# Patient Record
Sex: FEMALE | Race: WHITE | Employment: PART TIME
[De-identification: names, ages, dates, MRNs, and addresses within clinical notes are randomized per-mention and may not be internally consistent; named-entity substitution may affect disease eponyms.]

---
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
## 2018-01-12 ENCOUNTER — OFFICE VISIT (OUTPATIENT)
Dept: PEDIATRIC NEUROLOGY | Age: 18
End: 2018-01-12
Payer: COMMERCIAL

## 2018-01-12 VITALS
SYSTOLIC BLOOD PRESSURE: 112 MMHG | HEART RATE: 68 BPM | WEIGHT: 143.4 LBS | HEIGHT: 65 IN | BODY MASS INDEX: 23.89 KG/M2 | DIASTOLIC BLOOD PRESSURE: 71 MMHG

## 2018-01-12 DIAGNOSIS — Q79.60 EDS (EHLERS-DANLOS SYNDROME): ICD-10-CM

## 2018-01-12 DIAGNOSIS — G44.221 CHRONIC TENSION-TYPE HEADACHE, INTRACTABLE: Primary | ICD-10-CM

## 2018-01-12 DIAGNOSIS — G47.9 SLEEP DIFFICULTIES: ICD-10-CM

## 2018-01-12 DIAGNOSIS — G90.A POTS (POSTURAL ORTHOSTATIC TACHYCARDIA SYNDROME): ICD-10-CM

## 2018-01-12 DIAGNOSIS — G43.111 INTRACTABLE MIGRAINE WITH AURA WITH STATUS MIGRAINOSUS: ICD-10-CM

## 2018-01-12 DIAGNOSIS — M54.2 NECK PAIN: ICD-10-CM

## 2018-01-12 PROCEDURE — 99215 OFFICE O/P EST HI 40 MIN: CPT | Performed by: PSYCHIATRY & NEUROLOGY

## 2018-01-12 RX ORDER — PROPRANOLOL HCL 60 MG
60 CAPSULE, EXTENDED RELEASE 24HR ORAL DAILY
Qty: 30 CAPSULE | Refills: 5 | Status: SHIPPED | OUTPATIENT
Start: 2018-01-12

## 2018-01-12 RX ORDER — UBIDECARENONE 100 MG
100 CAPSULE ORAL EVERY MORNING
Qty: 30 CAPSULE | Refills: 5 | Status: SHIPPED | OUTPATIENT
Start: 2018-01-12

## 2018-01-12 RX ORDER — DIHYDROERGOTAMINE MESYLATE 4 MG/ML
1 SPRAY NASAL PRN
Qty: 1 ML | Refills: 5 | Status: SHIPPED | OUTPATIENT
Start: 2018-01-12

## 2018-01-12 RX ORDER — ASCORBIC ACID 500 MG
500 TABLET ORAL DAILY
Qty: 30 TABLET | Refills: 5 | Status: SHIPPED | OUTPATIENT
Start: 2018-01-12

## 2018-01-12 NOTE — LETTER
some occasions. Amor states that she has utilized the Reece Schwab" in the past which has provided relief of her migraine symptoms. Amor states that she has tried the Imitrex nasal spray in the past to help relieve the symptoms of her migraine, however this seemed to make the migraines worse so she stopped using this medication. Migraine description provided below:     MIGRAINE DESCRIPTION:   They describe the pain to involve the bifrontal and temporal regions at an intensity of 9/10-10/10. Amor states that prior to the migraine, she will see fuzzy dots. Also prior to the migraine, she will have numbness in her hands and then it will radiate to her whole body. Amor describes the migraine to be a throbbing 9-10/10 pain in her temples. She also complains of \"deep pain\" behind her eyes prior to her migraine. When she has a migraine, she prefers to lay in a dark, quiet room. She states that she will also feel nauseaous with the headaches. Sleep gives partial relief of these migraines. SLEEP ISSUES:   Amor states that she continues to have issues with sleep which remains virtually unchanged from the last visit. She states that her sleep continues to be disturbed as she will lay down for bed between 7-8 pm and it will take her up to 2 hours to fall asleep at night. Amor states that she continues to wake up several occasions throughout the night, usually to use the restroom or get something to eat. Amor states that when she tries to lay down to go back to sleep, she has a difficult time because she cannot get comfortable. Amor states that she continues to wake up between 6-7 am every morning. Amor denies any daytime sleepiness at this time as she \"has no time for naps\". Amor states that she continues to take Adderall in this regard which is also being managed by Dr. Hitesh Bryan for fatigue, POTS and \"brain fog. \"     NECK PAIN: Amor states that she continues to have neck pain and describes the neck pain as \"pretty intense\". She states that the pain usually radiates from her head down to her neck and shoulders. Amor states that she has been doing massage every two weeks which has seemed to provide some relief of her neck pain. It is to be reported that Amor was diagnosed with joint hypermobility in the past. This remains virtually unchanged from the last visit. Amor states that she is no longer taking Gabapentin in this regard. Previously Tried Medications: Propranolol, Botox injections - ineffective, pain meds - ineffective, Imitrex - ineffective, Elavil - taken off due to POTS, Magnesium Oxide and Vitamin B2 (course completed), Topamax (ineffective), Depakote (increased headaches), Florinef (ineffective and not able to tolerate), Midodrine (ineffective and not able to tolerate), Gabapentin    REVIEW OF SYSTEMS:  Constitutional: Negative. Eyes: Negative. Respiratory: Negative. Cardiovascular: Negative. Gastrointestinal: Negative. Genitourinary: Negative. Musculoskeletal: Negative    Skin: Negative. Neurological: positive for headaches and migraines, negative for seizures, negative for developmental delays. Hematological: Negative. Psychiatric/Behavioral: negative for behavioral issues, negative for ADHD, positive for sleep issues    All other systems reviewed and are negative. Past, social, family, and developmental history was reviewed and unchanged. OBJECTIVE:   PHYSICAL EXAM  /71   Pulse 68   Ht 5' 5\" (1.651 m)   Wt 143 lb 6.4 oz (65 kg)   BMI 23.86 kg/m²     Neurological: she is alert and has normal strength and normal reflexes. she displays no atrophy, no tremor and normal reflexes. No cranial nerve deficit or sensory deficit. she exhibits normal muscle tone. she can stand and walk. she displays no seizure activity. Reflex Scores: 2+ diffuse.  No focal weakness noted on exam. Nursing note and vitals reviewed. Constitutional: she appears well-developed and well-nourished. HENT: Mouth/Throat: Mucous membranes are moist.   Eyes: EOM are normal. Pupils are equal, round, and reactive to light. Fundoscopic exam reveals sharp discs bilaterally. Neck: Normal range of motion. Neck supple. Cardiovascular: Regular rhythm, S1 normal and S2 normal.   Pulmonary/Chest: Effort normal and breath sounds normal.   Lymph Nodes: No significant lymphadenopathy noted. Musculoskeletal: Normal range of motion. Neurological: she is alert and rest of the exam is as mentioned above. Skin: Skin is warm and dry. No lesions or ulcers. RECORD REVIEW: Previous medical records were reviewed at today's visit. DIAGNOSTIC STUDIES:   03/14/2016 - US Kidney - Normal      Ref. Range 2/22/2017 18:46   Sodium Latest Ref Range: 135 - 144 mmol/L 141   Potassium Latest Ref Range: 3.7 - 5.3 mmol/L 3.9   Chloride Latest Ref Range: 98 - 107 mmol/L 105   CO2 Latest Ref Range: 20 - 31 mmol/L 22   BUN Latest Ref Range: 6 - 20 mg/dL 6   Creatinine Latest Ref Range: 0.50 - 0.90 mg/dL 0.60   Glucose Latest Ref Range: 70 - 99 mg/dL 91   Calcium Latest Ref Range: 8.6 - 10.4 mg/dL 9.2   Lipase Latest Ref Range: 13 - 60 U/L 33   hCG Qual Latest Ref Range: NEG  NEGATIVE   WBC Latest Ref Range: 4.5 - 13.5 k/uL 8.0   RBC Latest Ref Range: 4.0 - 5.2 m/uL 4.37   Hemoglobin Quant Latest Ref Range: 12.0 - 16.0 g/dL 12.9   Hematocrit Latest Ref Range: 36 - 46 % 38.6   Platelet Count Latest Ref Range: 140 - 450 k/uL 233     ASSESSMENT:   Sandra Houston is a 21 y.o. female with:  1. Chronic Daily Headaches, which continue to remain a concern. 2. Migraines with Aura which continues to remain a concern. 3. History of POTS and NCS for which she follows with Dr. Ruthy Grier at [facility].   4. Sleep issues consisting of sleep initiation and awakenings throughout the night, which continues to persist.

## 2018-01-12 NOTE — PROGRESS NOTES
DESCRIPTION:   They describe the pain to involve the bifrontal and temporal regions at an intensity of 9/10-10/10. Amor states that prior to the migraine, she will see fuzzy dots. Also prior to the migraine, she will have numbness in her hands and then it will radiate to her whole body. Amor describes the migraine to be a throbbing 9-10/10 pain in her temples. She also complains of \"deep pain\" behind her eyes prior to her migraine. When she has a migraine, she prefers to lay in a dark, quiet room. She states that she will also feel nauseaous with the headaches. Sleep gives partial relief of these migraines. SLEEP ISSUES:   Amor states that she continues to have issues with sleep which remains virtually unchanged from the last visit. She states that her sleep continues to be disturbed as she will lay down for bed between 7-8 pm and it will take her up to 2 hours to fall asleep at night. Amor states that she continues to wake up several occasions throughout the night, usually to use the restroom or get something to eat. Amor states that when she tries to lay down to go back to sleep, she has a difficult time because she cannot get comfortable. Amor states that she continues to wake up between 6-7 am every morning. Amor denies any daytime sleepiness at this time as she \"has no time for naps\". Amor states that she continues to take Adderall in this regard which is also being managed by Dr. Ivette Mendez for fatigue, POTS and \"brain fog. \"     NECK PAIN:   Amor states that she continues to have neck pain and describes the neck pain as \"pretty intense\". She states that the pain usually radiates from her head down to her neck and shoulders. Amor states that she has been doing massage every two weeks which has seemed to provide some relief of her neck pain.  It is to be reported that Amor was diagnosed with joint hypermobility in the past. This remains virtually unchanged from the last visit. [facility] states that she is no longer taking Gabapentin in this regard. Previously Tried Medications: Propranolol, Botox injections - ineffective, pain meds - ineffective, Imitrex - ineffective, Elavil - taken off due to POTS, Magnesium Oxide and Vitamin B2 (course completed), Topamax (ineffective), Depakote (increased headaches), Florinef (ineffective and not able to tolerate), Midodrine (ineffective and not able to tolerate), Gabapentin    REVIEW OF SYSTEMS:  Constitutional: Negative. Eyes: Negative. Respiratory: Negative. Cardiovascular: Negative. Gastrointestinal: Negative. Genitourinary: Negative. Musculoskeletal: Negative    Skin: Negative. Neurological: positive for headaches and migraines, negative for seizures, negative for developmental delays. Hematological: Negative. Psychiatric/Behavioral: negative for behavioral issues, negative for ADHD, positive for sleep issues    All other systems reviewed and are negative. Past, social, family, and developmental history was reviewed and unchanged. OBJECTIVE:   PHYSICAL EXAM  /71   Pulse 68   Ht 5' 5\" (1.651 m)   Wt 143 lb 6.4 oz (65 kg)   BMI 23.86 kg/m²    Neurological: she is alert and has normal strength and normal reflexes. she displays no atrophy, no tremor and normal reflexes. No cranial nerve deficit or sensory deficit. she exhibits normal muscle tone. she can stand and walk. she displays no seizure activity. Reflex Scores: 2+ diffuse. No focal weakness noted on exam.    Nursing note and vitals reviewed. Constitutional: she appears well-developed and well-nourished. HENT: Mouth/Throat: Mucous membranes are moist.   Eyes: EOM are normal. Pupils are equal, round, and reactive to light. Fundoscopic exam reveals sharp discs bilaterally. Neck: Normal range of motion. Neck supple.    Cardiovascular: Regular rhythm, S1 normal and S2 normal.   Pulmonary/Chest: Effort normal and breath sounds normal.   Lymph Nodes: No significant lymphadenopathy noted. Musculoskeletal: Normal range of motion. Neurological: she is alert and rest of the exam is as mentioned above. Skin: Skin is warm and dry. No lesions or ulcers. RECORD REVIEW: Previous medical records were reviewed at today's visit. DIAGNOSTIC STUDIES:   03/14/2016 - US Kidney - Normal      Ref. Range 2/22/2017 18:46   Sodium Latest Ref Range: 135 - 144 mmol/L 141   Potassium Latest Ref Range: 3.7 - 5.3 mmol/L 3.9   Chloride Latest Ref Range: 98 - 107 mmol/L 105   CO2 Latest Ref Range: 20 - 31 mmol/L 22   BUN Latest Ref Range: 6 - 20 mg/dL 6   Creatinine Latest Ref Range: 0.50 - 0.90 mg/dL 0.60   Glucose Latest Ref Range: 70 - 99 mg/dL 91   Calcium Latest Ref Range: 8.6 - 10.4 mg/dL 9.2   Lipase Latest Ref Range: 13 - 60 U/L 33   hCG Qual Latest Ref Range: NEG  NEGATIVE   WBC Latest Ref Range: 4.5 - 13.5 k/uL 8.0   RBC Latest Ref Range: 4.0 - 5.2 m/uL 4.37   Hemoglobin Quant Latest Ref Range: 12.0 - 16.0 g/dL 12.9   Hematocrit Latest Ref Range: 36 - 46 % 38.6   Platelet Count Latest Ref Range: 140 - 450 k/uL 233     ASSESSMENT:   Paresh Myles is a 21 y.o. female with:  1. Chronic Daily Headaches, which continue to remain a concern. 2. Migraines with Aura which continues to remain a concern. 3. History of POTS and NCS for which she follows with Dr. Bharath Archer at [facility]. 4. Sleep issues consisting of sleep initiation and awakenings throughout the night, which continues to persist.   5. Blood pressure of 112/71 at today's visit. At her previous visit, her blood pressure was 123/85. 6. Diagnosis of Ehler Danlos syndrome Type 3. PLAN:   1. Continue Trokendi XR but increase the dose to 75 mg.   2. I would like her to start Turmeric at 500 mg - 1000 mg daily along with black pepper extract. 3. She will also benefit from black seed oil at 1 teaspoon daily. 4. I recommend she take Migranal nasal spray at onset of migraine. 5. Continue CoQ 10 at 100 mg in the morning.